# Patient Record
(demographics unavailable — no encounter records)

---

## 2024-12-04 NOTE — DISCUSSION/SUMMARY
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 He remains on adjuvant clopidogrel antiplatelet therapy.  Post procedure carotid US with no significant hemodynamic stenosis of the right carotid artery. Patent left carotid stent crossing the bulb.  10/31/24: LEFT PROX ICA = 107 cm/s LEFT DIST ICA = 70 cm/s  Historical: 8/2023 LICA  9/22/2023 - Post stent carotid US with normal velocities and no stenosis bilaterally. 12/2023- LICA ; ICA/CCA ratio: 7.8 ([facility]) 3/15/2024- PROXIMAL LICA: 348 ICA/CCA: 8.9  6/11/2024- DIST LICA = 256 ICA/CCA ratio = 8.3 9/9/2024- MID LICA= 390 ICA/CCA ratio= 14.2  Plan to remain on Xarelto and Plavix daily, tolerating the regimen well.  He needs routine dental cleaning. I do not recommend any interruption in anticoagulation/antiplatelet therapy. There should be no issue getting dental cleaning done on medications.   Will plan for repeat carotid US in 2 months.   PLAN: 1. Continue clopidogrel 75mg daily 2. Continue Xarelto 20 mg daily 3. Carotid US in 2 months 4. Fall precautions   Patient was educated about signs and symptoms of stroke and was counseled to contact 911 upon emergence of stroke-like symptoms.  Follow up in 2 months, or sooner if needed.  All of the patient's questions and concerns were addressed.

## 2024-12-04 NOTE — PHYSICAL EXAM
[FreeTextEntry1] : GENERAL APPEARANCE: Well developed, well-nourished man in no acute distress. EXT: right groin access site clean, dry and intact. Minimal bruising. Small palpable, non-tender, rubbery 2cm subcutaneous lump.    NEUROLOGIC EXAM: MENTAL STATUS: Alert and Oriented to person, place and time. Speech is fluent, without aphasia or dysarthria Behavior and affect appropriate to situation.                         CRANIAL NERVES: CN 2:    Visual fields are full to confrontation. CN 3, 4, 6: Extraocular movements are intact. No nystagmus or ophthalmoplegia is evident. Pupils are equally round and reactive to light. CN 5:     Facial sensation is intact to light touch in all 3 divisions CN 7:     Facial excursion is full and symmetric bilaterally. MOTOR: Strength is 5/5 throughout for age and stature. No orbiting or drift noted. SENSORY: Intact to light touch perception in all four extremities without extinction to double simultaneous stimulation COORD: Finger to nose testing without dysmetria bilaterally. Action tremor bilaterally. GAIT: Wide based station and wobbly gait. Ambulates with minimal assistance.

## 2024-12-04 NOTE — HISTORY OF PRESENT ILLNESS
[FreeTextEntry1] : Mr. Whitmore presents today for follow up. Now status post successful angioplasty and re-stenting of left internal carotid artery with embolic protection 10/31/24. He is doing well. Groin site healed well. Compliant with Xarelto/Plavix. Denies any bleeding issues. No interval stroke/TIA symptoms.

## 2025-02-04 NOTE — DISCUSSION/SUMMARY
[FreeTextEntry1] : This is a 80-year-old man with history of renal cancer, single kidney, congenital b/l club feet, hypertension, dyslipidemia, afib on Xarelto, chronic tobacco use. 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 He remains on adjuvant clopidogrel antiplatelet therapy.  Recent US Carotid showed mildly increased velocities in the distal LICA 180 cm/s.  Will transition from clopidogrel to aspirin 81mg daily in addition to his Xarelto today.   I have counseled the patient in regards to potentially fatal or disabling intracranial or gastrointestinal hemorrhage with anticoagulation.  I have also advised him about the potential further increased risk of significant hemorrhage with the addition of antiplatelet therapy while anticoagulated.  Historical: 8/2023 LICA  9/22/2023 - Post stent carotid US with normal velocities and no stenosis bilaterally. 12/2023- LICA ; ICA/CCA ratio: 7.8 ([facility]) 3/15/2024- PROXIMAL LICA: 348 ICA/CCA: 8.9 6/11/2024- DIST LICA = 256 ICA/CCA ratio = 8.3 9/9/2024- MID LICA= 390 ICA/CCA ratio= 14.2 10/31/24: LEFT PROX ICA = 107 cm/s; LEFT DIST ICA = 70 cm/s  PLAN: 1. Stop clopidogrel, begin ASA 81 mg daily 2. Continue Xarelto 20 mg daily 3. Carotid US in 6 months 4. Fall precautions   Patient was educated about signs and symptoms of stroke and was counseled to contact 911 upon emergence of stroke-like symptoms.  Follow up in 6 months, or sooner if needed. All of the patient's questions and concerns were addressed.

## 2025-02-04 NOTE — HISTORY OF PRESENT ILLNESS
[FreeTextEntry1] : Mr. Whitmore presents today for follow up. Now status post successful angioplasty and re-stenting of left internal carotid artery with embolic protection 10/31/24. He is doing well. Compliant with Xarelto/Plavix. Denies any bleeding issues. No interval stroke/TIA symptoms. No other health issues since the last visit.   Recent US Carotid showed mildly increased velocities in the distal LICA 180 cm/s.

## 2025-02-04 NOTE — PHYSICAL EXAM
[General Appearance - Alert] : alert [General Appearance - In No Acute Distress] : in no acute distress [General Appearance - Well Nourished] : well nourished [General Appearance - Well Developed] : well developed [Oriented To Time, Place, And Person] : oriented to person, place, and time [Memory Recent] : recent memory was not impaired [Person] : oriented to person [Place] : oriented to place [Time] : oriented to time [Cranial Nerves Optic (II)] : visual acuity intact bilaterally,  visual fields full to confrontation, pupils equal round and reactive to light [Cranial Nerves Oculomotor (III)] : extraocular motion intact [Cranial Nerves Trigeminal (V)] : facial sensation intact symmetrically [Cranial Nerves Facial (VII)] : face symmetrical [Motor Strength] : muscle strength was normal in all four extremities [Sensation Tactile Decrease] : light touch was intact [Tremor] : a tremor present [Paresis Pronator Drift Right-Sided] : no pronator drift on the right [Paresis Pronator Drift Left-Sided] : no pronator drift on the left [Motor Strength Upper Extremities Right] : strength was normal in the right upper extremity [Motor Strength Upper Extremities Left] : strength was normal in the left upper extremity [Motor Strength Lower Extremities Right] : strength was normal in the right lower extremity [Motor Strength Lower Extremities Left] : strength was normal in the left lower extremity [Coordination - Dysmetria Impaired Finger-to-Nose Bilateral] : not present [FreeTextEntry6] : Limited motion in b/l feet due to congenital b/l club feet  [FreeTextEntry8] : Mild action tremor; unsteady gait, short steps